# Patient Record
Sex: MALE | Race: WHITE | URBAN - METROPOLITAN AREA
[De-identification: names, ages, dates, MRNs, and addresses within clinical notes are randomized per-mention and may not be internally consistent; named-entity substitution may affect disease eponyms.]

---

## 2017-01-01 ENCOUNTER — MEDICAL CORRESPONDENCE (OUTPATIENT)
Dept: HEALTH INFORMATION MANAGEMENT | Facility: CLINIC | Age: 72
End: 2017-01-01

## 2017-01-01 ENCOUNTER — OFFICE VISIT (OUTPATIENT)
Dept: NEUROLOGY | Facility: CLINIC | Age: 72
End: 2017-01-01

## 2017-01-01 ENCOUNTER — ALLIED HEALTH/NURSE VISIT (OUTPATIENT)
Dept: NEUROLOGY | Facility: CLINIC | Age: 72
End: 2017-01-01

## 2017-01-01 ENCOUNTER — TRANSFERRED RECORDS (OUTPATIENT)
Dept: HEALTH INFORMATION MANAGEMENT | Facility: CLINIC | Age: 72
End: 2017-01-01

## 2017-01-01 VITALS
HEART RATE: 62 BPM | OXYGEN SATURATION: 99 % | DIASTOLIC BLOOD PRESSURE: 69 MMHG | BODY MASS INDEX: 25.18 KG/M2 | WEIGHT: 170 LBS | HEIGHT: 69 IN | SYSTOLIC BLOOD PRESSURE: 109 MMHG

## 2017-01-01 DIAGNOSIS — M62.81 GENERALIZED MUSCLE WEAKNESS: Primary | ICD-10-CM

## 2017-01-01 DIAGNOSIS — Z71.9 VISIT FOR COUNSELING: Primary | ICD-10-CM

## 2017-01-01 DIAGNOSIS — G12.21 ALS (AMYOTROPHIC LATERAL SCLEROSIS) (H): Primary | ICD-10-CM

## 2017-01-01 DIAGNOSIS — G12.20 MOTOR NEURON DISEASE OR SYNDROME (H): Primary | ICD-10-CM

## 2017-01-01 LAB
EXPTIME-PRE: 10.3 SEC
EXPTIME-PRE: 10.3 SEC
FEF2575-%PRED-POST: 32 %
FEF2575-%PRED-POST: 32 %
FEF2575-%PRED-PRE: 99 %
FEF2575-%PRED-PRE: 99 %
FEF2575-POST: 0.72 L/SEC
FEF2575-POST: 0.72 L/SEC
FEF2575-PRE: 2.19 L/SEC
FEF2575-PRE: 2.19 L/SEC
FEF2575-PRED: 2.21 L/SEC
FEF2575-PRED: 2.21 L/SEC
FEFMAX-%PRED-PRE: 100 %
FEFMAX-%PRED-PRE: 100 %
FEFMAX-PRE: 7.71 L/SEC
FEFMAX-PRE: 7.71 L/SEC
FEFMAX-PRED: 7.7 L/SEC
FEFMAX-PRED: 7.7 L/SEC
FEV1-%PRED-PRE: 97 %
FEV1-%PRED-PRE: 97 %
FEV1-PRE: 2.91 L
FEV1-PRE: 2.91 L
FEV1FEV6-PRE: 76 %
FEV1FEV6-PRE: 76 %
FEV1FEV6-PRED: 77 %
FEV1FEV6-PRED: 77 %
FEV1FVC-PRE: 75 %
FEV1FVC-PRE: 75 %
FEV1FVC-PRED: 76 %
FEV1FVC-PRED: 76 %
FIFMAX-PRE: 5.18 L/SEC
FIFMAX-PRE: 5.18 L/SEC
FVC-%PRED-PRE: 98 %
FVC-%PRED-PRE: 98 %
FVC-PRE: 3.89 L
FVC-PRE: 3.89 L
FVC-PRED: 3.96 L
FVC-PRED: 3.96 L
MEP-PRE: 70 CMH2O
MEP-PRE: 70 CMH2O
MIP-PRE: -60 CMH2O
MIP-PRE: -60 CMH2O

## 2017-01-01 RX ORDER — FAMOTIDINE 20 MG/1
20 TABLET, FILM COATED ORAL
COMMUNITY
Start: 2017-01-01

## 2017-01-01 RX ORDER — CLOPIDOGREL BISULFATE 75 MG/1
75 TABLET ORAL
COMMUNITY
Start: 2017-01-01

## 2017-01-01 RX ORDER — ROSUVASTATIN CALCIUM 20 MG/1
20 TABLET, COATED ORAL
COMMUNITY
Start: 2017-01-01

## 2017-01-01 RX ORDER — ASPIRIN 325 MG
325 TABLET ORAL
COMMUNITY

## 2017-01-01 RX ORDER — RILUZOLE 50 MG/1
50 TABLET, FILM COATED ORAL EVERY 12 HOURS
Qty: 60 TABLET | Refills: 3 | Status: SHIPPED | OUTPATIENT
Start: 2017-01-01

## 2017-01-01 RX ORDER — LISINOPRIL 5 MG/1
5 TABLET ORAL
COMMUNITY
Start: 2017-01-01

## 2017-01-01 RX ORDER — CARVEDILOL 3.12 MG/1
3.12 TABLET ORAL
COMMUNITY
Start: 2017-01-01

## 2017-01-01 RX ORDER — TAMSULOSIN HYDROCHLORIDE 0.4 MG/1
0.4 CAPSULE ORAL
COMMUNITY

## 2017-01-01 RX ORDER — LACTULOSE 10 G/15ML
30 SOLUTION ORAL
COMMUNITY
Start: 2016-08-10

## 2017-01-01 RX ORDER — CYCLOBENZAPRINE HCL 10 MG
10 TABLET ORAL
COMMUNITY
Start: 2016-08-10

## 2017-01-01 RX ORDER — TRAMADOL HYDROCHLORIDE 50 MG/1
50 TABLET ORAL
COMMUNITY
Start: 2016-08-10

## 2017-01-01 ASSESSMENT — PAIN SCALES - GENERAL: PAINLEVEL: NO PAIN (0)

## 2017-11-02 NOTE — MR AVS SNAPSHOT
After Visit Summary   11/2/2017    Gomez Rice    MRN: [id number]           Patient Information     Date Of Birth          1945        Visit Information        Provider Department      11/2/2017 11:00 AM Boby Jay MD [facility] Neurology        Today's Diagnoses     ALS (amyotrophic lateral sclerosis) (H)    -  1      Care Instructions    Dr Jay has prescribed Riluzole and provided you with standing lab orders to have repeat labs 30 days, 60 days and 90 days after starting.  Your [facility] can assist you for future refills of the Riluzole or if you choose to start Radicava.    If you have questions or concerns following today's appointment, please contact    the neurology department triage line at [phone] option 3, ask for Chana.                      Follow-ups after your visit        Additional Services     PHYSICAL THERAPY REFERRAL       PT Clinician to evaluate and treat patient in ALS Clinic.            SPEECH THERAPY REFERRAL       Speech Language Pathologist to evaluate and treat patient in ALS Clinic.            OCCUPATIONAL THERAPY REFERRAL       OT Clinician to evaluate and treat patient in ALS Clinic.                  Future tests that were ordered for you today     Open Standing Orders        Priority Remaining Interval Expires Ordered    Hepatic panel Routine 3/3 monthly 11/2/2018 11/2/2017            Who to contact     Please call your clinic at [phone] to:    Ask questions about your health    Make or cancel appointments    Discuss your medicines    Learn about your test results    Speak to your doctor   If you have compliments or concerns about an experience at your clinic, or if you wish to file a complaint, please contact [facility] Patient Relations at [phone] or email us at [email or web address]         Additional Information About Your Visit        MyChart Information     MyChart  "is an electronic gateway that provides easy, online access to your medical records. With KwiClick, you can request a clinic appointment, read your test results, renew a prescription or communicate with your care team.     To sign up for KwiClick visit the website at [email or web address]   You will be asked to enter the access code listed below, as well as some personal information. Please follow the directions to create your username and password.     Your access code is: [email or web address]  Expires: 2018  6:30 AM     Your access code will  in 90 days. If you need help or a new code, please contact your [facility] or call [phone] for assistance.        Care EveryWhere ID     This is your Care EveryWhere ID. This could be used by other organizations to access your [facility] medical records  [id number]        Your Vitals Were     Pulse Height Pulse Oximetry BMI (Body Mass Index)          62 1.753 m (5' 9\") 99% 25.1 kg/m2         Blood Pressure from Last 3 Encounters:   17 109/69    Weight from Last 3 Encounters:   17 77.1 kg (170 lb)                 Today's Medication Changes          These changes are accurate as of: 17  1:08 PM.  If you have any questions, ask your nurse or doctor.               Start taking these medicines.        Dose/Directions    riluzole 50 MG tablet   Commonly known as:  RILUTEK   Used for:  ALS (amyotrophic lateral sclerosis) (H)   Started by:  Boby Jay MD        Dose:  50 mg   Take 1 tablet (50 mg) by mouth every 12 hours   Quantity:  60 tablet   Refills:  3            Where to get your medicines      Some of these will need a paper prescription and others can be bought over the counter.  Ask your nurse if you have questions.     Bring a paper prescription for each of these medications     riluzole 50 MG tablet                Primary Care Provider Office Phone # Fax #    Alexandru Bullock [phone] " [phone]       [facility] [address]  [facility] [address]        Equal Access to Services     KRISTAL DICKSON : Hadkirby kristi hylton rolly Gandara, waartda luqjeff, qanahumta kaalmada brisa, kendall courtneybrayan paul laKikizoran mcnally. So [facility] [phone].    ATENCIÓN: Si habla español, tiene a alvarenga disposición servicios gratuitos de asistencia lingüística. Llame al [phone].    We comply with applicable federal civil rights laws and [place] laws. We do not discriminate on the basis of race, color, national origin, age, disability, sex, sexual orientation, or gender identity.            Thank you!     Thank you for choosing [facility] NEUROLOGY  for your care. Our goal is always to provide you with excellent care. Hearing back from our patients is one way we can continue to improve our services. Please take a few minutes to complete the written survey that you may receive in the mail after your visit with us. Thank you!             Your Updated Medication List - Protect others around you: Learn how to safely use, store and throw away your medicines at www.disposemymeds.org.          This list is accurate as of: 11/2/17  1:08 PM.  Always use your most recent med list.                   Brand Name Dispense Instructions for use Diagnosis    aspirin 325 MG tablet      Take 325 mg by mouth        carvedilol 3.125 MG tablet    COREG     Take 3.125 mg by mouth        clopidogrel 75 MG tablet    PLAVIX     Take 75 mg by mouth        cyclobenzaprine 10 MG tablet    FLEXERIL     Take 10 mg by mouth        famotidine 20 MG tablet    PEPCID     Take 20 mg by mouth        lactulose 10 GM/15ML solution    CHRONULAC     Take 30 mLs by mouth        lisinopril 5 MG tablet    PRINIVIL/ZESTRIL     Take 5 mg by mouth        riluzole 50 MG tablet    RILUTEK    60 tablet    Take 1 tablet (50 mg) by mouth every 12 hours    ALS (amyotrophic lateral sclerosis) (H)       rosuvastatin 20 MG tablet    CRESTOR     Take 20 mg by mouth         tamsulosin 0.4 MG capsule    FLOMAX     Take 0.4 mg by mouth        traMADol 50 MG tablet    ULTRAM     Take 50 mg by mouth

## 2017-11-02 NOTE — PROGRESS NOTES
Social Work Brief Intervention  [facility]    Data/Intervention:    Patient Name:  Gomez Rice  /Age:  1945 (72 year old)    Visit Type: in person  Referral Source: ALS clinic  Reason for Referral:  assessment    Collaborated With:    -Gomez, his dtr/son in law and grand dtr  -Members of the ALS interdisciplinary team    Patient Concerns/Issues:   Pt plans to follow with the [facility] who are already involved and securing DME and services for Pt. He is here today to confirm dx. [facility] who [facility].   Pt was living alone and recently moved in with his dtr and son in law. The [facility] is paying for a ramp for their house. Dtr has MS and will have limited capacity for a lot of physical care of Pt. Discussed that he will be able to get personal care assistance thru the [facility] and ALSA association.     Intervention/Education/Resources Provided:  ALSA services  Emotional support provided today as his dx was confirmed.    Assessment/Plan:  No immediate needs identified today. Pt has good family support and will continue to be seen at the [facility] and work with SW there for services at home.

## 2017-11-02 NOTE — LETTER
2017      Alexandru Bullock MD   [facility]    [address] [place] [address]       RE: Gomez Rice   MRN: [id number]   : 1945      Dear Doctors:      I had the pleasure to see Mr. Rice at the [facility] for suspected ALS.  As you know, he is a very pleasant 72-year-old  who noticed symptoms 3 months ago.  He began falling and was tripping, especially with the right more than left foot.  His leg weakness got progressively worse over the next few months.  He now has some difficulty ascending stairs; he has to hold rails.  He has begun using a walker about 2 weeks ago.  He needs the walker inside the house too to prevent falls.  A power [facility] in [facility].  He has anorexia and has lost about 12 pounds of weight in the last few months.  He also describes widespread muscle twitching in his calves, thighs, upper extremities, right more than left.  About 2 months ago he noticed difficulty using his hands, dropping things very frequently, clumsiness, difficulty cutting his food and impairment of his handwriting.  He can sign and he can write still neatly but he has to slow down considerably.  He takes much longer to eat his food and put the spoon close to his mouth.About a month or 2 ago, he also developed dysphagia to liquids and solids with pretty frequent choking episodes, and increasingly slurred speech.  His voice is quieter and more effortful.  He has mild sialorrhea during the day and at night, but it is not particularly disturbing.  He gets more emotional lately but does not describe typical pseudobulbar affect.  He sometimes has difficulty clearing thicker secretions from his mouth.  He has not developed any head drop.  He additionally endorses dyspnea with standing, transferring, exertion or even in the shower. He sleeps with 2-3 pillows at night which is a  new habit.  He used to sleep with about one.  The reason he has to put additional pillows is not only neck discomfort but probably orthopnea, too. He denies numbness, tingling or other sensory symptoms in upper or lower extremities.  He has no incontinence of bowel or bladder.  No radicular pain.      [facility], the result of which I have not received yet.        PAST MEDICAL HISTORY:   Significant for prostate cancer diagnosed 8 months ago and treated with radiation and hormonal therapy.  He has not undergone any surgery.  He has congestive heart failure, coronary artery disease, status post stenting, hypertension, hyperlipidemia.  He also had a history of hydrocephalus and an arachnoid cyst in the brain, status post  shunt placement in 2005.        SOCIAL HISTORY:   He drinks a couple of glasses of wine per day.  No illicit drug use.  He quit smoking 2007 after his heart attack.  He lives alone in the [facility] but family is close.      CURRENT MEDICATIONS:   As per Epic record.       ALLERGIES:   Amoxicillin, reaction nausea and vomiting.        FAMILY HISTORY:   Negative for ALS, dementia, Parkinsonism or similar neurodegenerative disorders.        PHYSICAL EXAMINATION:   VITAL SIGNS:   His blood pressure is 109/69.  Pulse 62 and regular.  O2 sat 99% on room air.  Weight 77.1 kilos.  Height is 175.  He endorses no pain.  Pulmonary function tests were surprisingly good in terms of vital capacity showing a forced vital capacity of 3.89 liters in the sitting position or 98% predicted for age, height and sex, similar FEV1, normal ratio, and mildly low MIP at -60 cm water with MEP of 70. Supine spirometry showed MIP of -40 and a 15% drop of FVC.   NEUROLOGIC:   He seems to be sitting comfortably in the chair.  When I had him count with a single breath he could hardly reach 16-18 which is not very consistent with the vital capacity results recorded in his spirometry.   However, he was counting slower than normal due to his dysarthria.  He has no ptosis or ophthalmoparesis.  There is no nystagmus, and normal eye ductions and versions.  There is normal pupil reaction to light and accommodation.  His visual fields are full to confrontation bilaterally.  He has a symmetric smile.  He has moderate weakness of orbicularis oculi.  He can seel his lips well but his cheek puff is mildly weak.  His pterygoid muscles are fairly strong.  Tongue does not show definite fasciculations or atrophy but lateral tongue movements are definitely slow.  He has an unequivocal spastic dysarthria, although he is generally intelligible.  He has a present but not very brisk jaw jerk.  His neck flexion is 4+.  His extension is 4, mildly weak.  His strength in the extremities is as follows (right/left):  Shoulder abduction 4/5, biceps 5/5, triceps 5/5, wrist extensors 5/5, finger extensors 4/4, APB 4/4, FDI 5/4,  5/5, hip flexion 4+/4+, quadriceps 5/5, hamstrings 5/5 and foot dorsiflexion interestingly 5/5.  What is definitely abnormal is his tone.  It is increased in the left arm and bilateral legs in a spastic fashion.  Right arm tone is probably normal.  He is unequivocally bradykinetic with repetitive finger tapping and fist opening closure, as well as foot tap in all 4 limbs, left worse than right.  His reflexes are pathologically brisk.  They are 3+ in the upper extremities with vertical spread in biceps, triceps, brachioradialis.  He has bilateral pectoralis reflex and Azucena.  His left knee reflex is 4+ clonic, right is 3+, ankle reflexes are right 4+, left 3+.  There are about 5-6 beats of ankle clonus on the right.  Plantar responses are bilaterally neutral or mute.  He has fasciculations in a widespread distribution, including the vasti, tibialis anterior, gastrocnemii, forearm muscles and hands.  His sensory exam shows low normal vibration at the toes for a 72-year-old, 6-7 seconds at the  toes and medial malleoli, 15 seconds at the right index finger, 24 on the left.  He has normal position sensation in the toes.  Finger-to-nose is done without dysmetria.  There is no tremor.  He has a spastic gait and with very slow steps.        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        I spent about 60 minutes with the patient, of which more than half was counseling.  I think the patient unequivocally has upper motor neuron signs in the bulbar, cervical and lumbosacral region as he has spastic dysarthria, jaw jerk, pathologic reflexes and 3 limb spasticity.  In addition to this, he has mild weakness and fasciculations in many regions.  Based on the above information, he qualifies for a diagnosis of clinically definite ALS.  I will do an EMG on him today to provide evidence of widespread denervation and if that is the case, then I think additional workup for ALS mimics is unlikely to be of yield, especially with the bulbar involvement.    We discussed his course.  The progression of the weakness has been quite rapid.  Arguably, the forced vital capacity of 98% is a little surprising.With supine positioning MIP fell to -40 cm H2O which indicates some diaphragmatic weakness. Based on those results, I recommend that he starts BiPAP. This can meaningfully prolong his survival and consolidate his nighttime sleep.  I will prescribe him riluzole 50 mg b.i.d.  He is willing to take it.  He understands that this prolongs survival in ALS by 3-4 months.  I will order liver function tests which should be [facility] from here on, and then 3-4 times a year thereafter.  We also discussed the recent approval of Edaravone (Radicava) which does slow down the disease progression if done early. Technically with a forced vital capacity of 98% and disease duration of only 3 months, he is a suitable candidate.  I recommend that the application and the orders of the medication are done through  the [facility], because this is a complex process and involves placement of a port, homecare, etc. I explained to the patient that he would be committed to 10 days a month of infusion for the rest of his life and he is willing to consider this at the very least.  [facility] [place] [facility] an excellent job meeting his needs regarding PT, OT and speech and we do not have anything to add from that perspective.  The plan is for the patient to follow up there at the [facility], because traveling to [place] from [place] with that rapidly advancing ALS will be very inconvenient to him, to say the very least.      Thank you for allowing me to participate in the care of this patient.  I will notify you with the EMG results.       Sincerely,       Boby Jay MD    cc:   Maryann López MD   Physical Medicine & Rehabilitation   Spinal Cord Injury Medicine    [facility] Department of [facility] Affairs      D: 2017 12:28   T: 2017 13:40   MT: SHARONDA    Name:     AMBER HOGAN   MRN:      [id number]        Account:      [id number]   :      1945           Service Date: 2017    Document: [id number]

## 2017-11-02 NOTE — MR AVS SNAPSHOT
After Visit Summary   2017    Gomez Rice    MRN: [id number]           Patient Information     Date Of Birth          1945        Visit Information        Provider Department      2017 2:09 PM [facility]        Today's Diagnoses     Visit for counseling    -  1       Follow-ups after your visit        Future tests that were ordered for you today     Open Standing Orders        Priority Remaining Interval Expires Ordered    Hepatic panel Routine 3/3 monthly 2018            Who to contact     Please call your clinic at [phone] to:    Ask questions about your health    Make or cancel appointments    Discuss your medicines    Learn about your test results    Speak to your doctor   If you have compliments or concerns about an experience at your clinic, or if you wish to file a complaint, please contact [facility] Patient Relations at [phone] or email us at [email or web address]         Additional Information About Your Visit        MyChart Information     Runfaces is an electronic gateway that provides easy, online access to your medical records. With Runfaces, you can request a clinic appointment, read your test results, renew a prescription or communicate with your care team.     To sign up for [employer] visit the website at [email or web address]   You will be asked to enter the access code listed below, as well as some personal information. Please follow the directions to create your username and password.     Your access code is: [email or web address]  Expires: 2018  6:30 AM     Your access code will  in 90 days. If you need help or a new code, please contact your [facility] or call [phone] for assistance.        Care EveryWhere ID     This is your Care EveryWhere ID. This could be used by other organizations to access your [facility]  records  [id number]         Blood Pressure from Last 3 Encounters:   11/02/17 109/69    Weight from Last 3 Encounters:   11/02/17 77.1 kg (170 lb)              Today, you had the following     No orders found for display         Today's Medication Changes          These changes are accurate as of: 11/2/17  2:25 PM.  If you have any questions, ask your nurse or doctor.               Start taking these medicines.        Dose/Directions    riluzole 50 MG tablet   Commonly known as:  RILUTEK   Used for:  ALS (amyotrophic lateral sclerosis) (H)   Started by:  Boby Jay MD        Dose:  50 mg   Take 1 tablet (50 mg) by mouth every 12 hours   Quantity:  60 tablet   Refills:  3            Where to get your medicines      Some of these will need a paper prescription and others can be bought over the counter.  Ask your nurse if you have questions.     Bring a paper prescription for each of these medications     riluzole 50 MG tablet                Primary Care Provider Office Phone # Fax #    Alexandru Bullock [phone]       [facility] [address] [address]        Equal Access to Services     [facility] : Hadii kristi hylton hadasho Sokim, waaxda luqadaha, qaybta kaalmada brisa, kendall mcnally. So [facility] [phone].    ATENCIÓN: Si habla español, tiene a alvarenga disposición servicios gratuitos de asistencia lingüística. Kurt al [phone].    We comply with applicable federal civil rights laws and [place] laws. We do not discriminate on the basis of race, color, national origin, age, disability, sex, sexual orientation, or gender identity.            Thank you!     Thank you for choosing [facility] NEUROLOGY  for your care. Our goal is always to provide you with excellent care. Hearing back from our patients is one way we can continue to improve our services. Please take a few minutes to complete the written survey that you may receive in the  mail after your visit with us. Thank you!             Your Updated Medication List - Protect others around you: Learn how to safely use, store and throw away your medicines at www.disposemymeds.org.          This list is accurate as of: 11/2/17  2:25 PM.  Always use your most recent med list.                   Brand Name Dispense Instructions for use Diagnosis    aspirin 325 MG tablet      Take 325 mg by mouth        carvedilol 3.125 MG tablet    COREG     Take 3.125 mg by mouth        clopidogrel 75 MG tablet    PLAVIX     Take 75 mg by mouth        cyclobenzaprine 10 MG tablet    FLEXERIL     Take 10 mg by mouth        famotidine 20 MG tablet    PEPCID     Take 20 mg by mouth        lactulose 10 GM/15ML solution    CHRONULAC     Take 30 mLs by mouth        lisinopril 5 MG tablet    PRINIVIL/ZESTRIL     Take 5 mg by mouth        riluzole 50 MG tablet    RILUTEK    60 tablet    Take 1 tablet (50 mg) by mouth every 12 hours    ALS (amyotrophic lateral sclerosis) (H)       rosuvastatin 20 MG tablet    CRESTOR     Take 20 mg by mouth        tamsulosin 0.4 MG capsule    FLOMAX     Take 0.4 mg by mouth        traMADol 50 MG tablet    ULTRAM     Take 50 mg by mouth

## 2017-11-02 NOTE — MR AVS SNAPSHOT
After Visit Summary   2017    Gomez Rice    MRN: [id number]           Patient Information     Date Of Birth          1945        Visit Information        Provider Department      2017 1:00 PM Boby Jay MD  Health EMG        Today's Diagnoses     Motor neuron disease or syndrome (H)    -  1       Follow-ups after your visit        Future tests that were ordered for you today     Open Standing Orders        Priority Remaining Interval Expires Ordered    Hepatic panel Routine 3/3 monthly 2018          Open Future Orders        Priority Expected Expires Ordered    Needle EMG Each Extremity w/Paraspinal Area Complete (94300) Routine  2018    Needle EMG Each Extremity w/Paraspinal Area Limited (91166) Routine  2018            Who to contact     Please call your clinic at [phone] to:    Ask questions about your health    Make or cancel appointments    Discuss your medicines    Learn about your test results    Speak to your doctor   If you have compliments or concerns about an experience at your clinic, or if you wish to file a complaint, please contact [facility] Patient Relations at [phone] or email us at [email or web address]         Additional Information About Your Visit        MyChart Information     [employer] is an electronic gateway that provides easy, online access to your medical records. With [employer], you can request a clinic appointment, read your test results, renew a prescription or communicate with your care team.     To sign up for [employer] visit the website at [email or web address]   You will be asked to enter the access code listed below, as well as some personal information. Please follow the directions to create your username and password.     Your access code is: [email or web address]  Expires: 2018  6:30 AM     Your access code will  in 90 days. If  you need help or a new code, please contact your [facility] or call [phone] for assistance.        Care EveryWhere ID     This is your Care EveryWhere ID. This could be used by other organizations to access your [facility] medical records  [id number]         Blood Pressure from Last 3 Encounters:   11/02/17 109/69    Weight from Last 3 Encounters:   11/02/17 77.1 kg (170 lb)              We Performed the Following     HC NCS MOTOR W OR W/O F-WAVE, 7 OR 8     Needle EMG Non-Extremity Muscles w/Nerve Conduction (95208)          Today's Medication Changes          These changes are accurate as of: 11/2/17  3:36 PM.  If you have any questions, ask your nurse or doctor.               Start taking these medicines.        Dose/Directions    riluzole 50 MG tablet   Commonly known as:  RILUTEK   Used for:  ALS (amyotrophic lateral sclerosis) (H)   Started by:  Boby Jay MD        Dose:  50 mg   Take 1 tablet (50 mg) by mouth every 12 hours   Quantity:  60 tablet   Refills:  3            Where to get your medicines      Some of these will need a paper prescription and others can be bought over the counter.  Ask your nurse if you have questions.     Bring a paper prescription for each of these medications     riluzole 50 MG tablet                Primary Care Provider Office Phone # Fax #    Alexandru Bullock [phone]       [facility] [address] [address]        Equal Access to Services     KRISTAL DICKSON AH: Hadii kristi lofton Sokim, waaxda luqadaha, qaybta kaalmada brisa, kendall mcnally. So [facility] [phone].    ATENCIÓN: Si habla español, tiene a alvarenga disposición servicios gratuitos de asistencia lingüística. Kurt al [phone].    We comply with applicable federal civil rights laws and [place] laws. We do not discriminate on the basis of race, color, national origin, age, disability, sex, sexual  orientation, or gender identity.            Thank you!     Thank you for choosing [facility]  for your care. Our goal is always to provide you with excellent care. Hearing back from our patients is one way we can continue to improve our services. Please take a few minutes to complete the written survey that you may receive in the mail after your visit with us. Thank you!             Your Updated Medication List - Protect others around you: Learn how to safely use, store and throw away your medicines at www.disposemymeds.org.          This list is accurate as of: 11/2/17  3:36 PM.  Always use your most recent med list.                   Brand Name Dispense Instructions for use Diagnosis    aspirin 325 MG tablet      Take 325 mg by mouth        carvedilol 3.125 MG tablet    COREG     Take 3.125 mg by mouth        clopidogrel 75 MG tablet    PLAVIX     Take 75 mg by mouth        cyclobenzaprine 10 MG tablet    FLEXERIL     Take 10 mg by mouth        famotidine 20 MG tablet    PEPCID     Take 20 mg by mouth        lactulose 10 GM/15ML solution    CHRONULAC     Take 30 mLs by mouth        lisinopril 5 MG tablet    PRINIVIL/ZESTRIL     Take 5 mg by mouth        riluzole 50 MG tablet    RILUTEK    60 tablet    Take 1 tablet (50 mg) by mouth every 12 hours    ALS (amyotrophic lateral sclerosis) (H)       rosuvastatin 20 MG tablet    CRESTOR     Take 20 mg by mouth        tamsulosin 0.4 MG capsule    FLOMAX     Take 0.4 mg by mouth        traMADol 50 MG tablet    ULTRAM     Take 50 mg by mouth

## 2017-11-02 NOTE — LETTER
11/2/2017       RE: Gomez Rice  [address]  [place] [address]     Dear Colleague,    Thank you for referring your patient, Gomez Rice, to the [facility] EMG at [facility]. Please see a copy of my visit note below.        [facility]  Electrodiagnostic Laboratory    Nerve Conduction & EMG Report          Patient: Gomez Rice YOB: 1945  Patient ID: [id number] Age: 72 Years 1 Months      History & Examination:    72 year old man with suspected ALS. EMG requested to evaluate for this diagnosis.     Techniques: Motor and sensory conduction studies were done with surface recording electrodes. Temperature was monitored and recorded throughout the study. Upper extremities were maintained at a temperature of 32 degrees Centigrade or higher.  Lower extremities were maintained at a temperature of 31degrees Centigrade or higher. EMG was done with a concentric needle electrode.     Results:    Right median, ulnar, radial, and sural antidromic sensory NCSs were normal. Right median motor NCS showed normal distal latency, mildly attenuated distal CMAP amplitude without segmental changes, and normal conduction velocities. Right ulnar motor NCS showed mildly prolonged distal latency, normal CMAP amplitudes, and normal conduction velocities. Right deep peroneal and tibial motor NCSs were normal. Right tibial and median F-wave latencies were normal.   EMG showed abnormal spontaneous activity, principally abundant fasciculations, in virtually all sampled muscles, innervated by the right cervical, thoracic, and lumbosacral segments, as well as the upper trapezius. Fibrillations/positive waves were prominent at the right tibialis anterior, medial gastrocnemius, and present to a milder extent at right FDI and pronator teres, as tabulated. Many muscles showed mildly to moderately reduced recruitment of polyphasic, but not large, or long duration MUPs, as  tabulated.    Interpretation:    Widespread denervation changes affecting bulbar, cervical, thoracic and lumbosacral myotomes, with normal sensory nerve conduction studies and no motor conduction block. This pattern is consistent with motor neuron disease/ALS. Polyradiculopathies and pureyl motor neuropathies are part of the differential diagnosis.     EMG Physician:    Boby Jay MD         Sensory NCS      Nerve / Sites Rec. Site Onset Peak NP Amp Ref. PP Amp Dist Aschin Ref. Temp     ms ms  V  V  V cm m/s m/s  C   R MEDIAN - Dig II Anti      Wrist Dig II 2.92 4.11 23.7 10.0 44.8 14 48.0 48.0 31.7   R ULNAR - Dig V Anti      Wrist Dig V 2.50 3.28 9.8 8.0 11.4 12.5 50.0 48.0 30.4   R RADIAL - Snuff      Forearm Snuff 2.08 2.60 25.2 15.0 31.8 11 52.8 48.0 29.8   R SURAL - Lat Mall 60      Calf Ankle 3.49 4.38 7.7 5.0 5.5 14 40.1 38.0 28      Calf Ankle 3.49 4.38 6.1 5.0 5.6 14 40.1  27.9       Motor NCS      Nerve / Sites Rec. Site Lat Ref. Amp Ref. Rel Amp Dist Sachin Ref. Dur. Area Temp.     ms ms mV mV % cm m/s m/s ms %  C   R MEDIAN - APB      Wrist APB 4.27 4.40 4.2 5.0 100 8   5.99 100 29.8      Elbow APB 8.85  3.6  86.2 25 54.5 48.0 7.45 91.4 29.7      Axilla APB 10.52  3.4  82.4 9 54.0  8.49 564 29.3   R ULNAR - ADM      Wrist ADM 3.80 3.50 7.2 5.0 100 8   5.57 100 31.1      B.Elbow ADM 7.76  6.0  82.6 21 53.1 48.0 6.30 157 31      A.Elbow ADM 9.84  5.4  74.8 10 48.0 48.0 6.98 193 30.8      Axilla ADM 11.20  4.5  62.7 8 59.1 48.0 7.34 114 30.7   R DEEP PERONEAL - EDB 60      Ankle EDB 4.06 6.00 3.1 2.0 100 8   7.24 100 28.3      FibHead EDB 11.51  2.6  83.1 34 45.7 38.0 7.97 77.1 28.3      Pop Fos EDB 13.33  2.4  77 7 38.4 38.0 8.75 83.7 28.3   R TIBIAL - AH      Ankle AH 4.58 6.00 8.3 4.0 100 8   8.13 100 28      Pop Fos AH 14.11  6.0  72.9 44 46.2 38.0 9.06 76.1 27.9       F  Wave      Nerve Min F Lat Max F Lat Mean FLat Temp.    ms ms ms  C   R TIBIAL 48.59 60.94 55.71 27.8   R MEDIAN 26.35 40.83  34.41 33.5       EMG Summary Table     Spontaneous MUAP Recruitment    IA Fib Fasc H.F. Amp Dur. PPP Pattern   R. FIRST D INTEROSS Normal 1+ 1+ None N N 1+ Moderate Reduced   R. TRICEPS Normal None 2+ None N N None Normal   R. DELTOID Normal None 1+ None N N 1+ Mild Reduced   R. PRON TERES Normal 2+ 3+ None N N None Normal   R. BICEPS Normal None 3+ None N N 1+ Mild Reduced   R. TIB ANTERIOR Normal 3+ 1+ None N N 2+ Moderate Reduced   R. GASTROCN (MED) Normal 3+ 1+ None N N 1+ Moderate Reduced   R. VAST LATERALIS Normal 1+ 2+ None N N None Reduced activation   R. TRAPEZIUS (U) Normal None 2+ None N N 1+ Mild Reduced   R. THOR PSP (M) Normal None 2+ None N N None Normal                                Again, thank you for allowing me to participate in the care of your patient.      Sincerely,    Boby Jay MD

## 2017-11-02 NOTE — PROGRESS NOTES
2017      Alexandru Bullock MD   [facility]    [address] [place] [address]         RE: Gomez Rice   MRN: [id number]   : 1945      Dear Doctors:      I had the pleasure to see Mr. Rice at the [facility] for suspected ALS.  As you know, he is a very pleasant 72-year-old  who noticed symptoms 3 months ago.  He began falling and was tripping, especially with the right more than left foot.  His leg weakness got progressively worse over the next few months.  He now has some difficulty ascending stairs; he has to hold rails.  He has begun using a walker about 2 weeks ago.  He needs the walker inside the house too to prevent falls.  [facility].  He has anorexia and has lost about 12 pounds of weight in the last few months.  He also describes widespread muscle twitching in his calves, thighs, upper extremities, right more than left.  About 2 months ago he noticed difficulty using his hands, dropping things very frequently, clumsiness, difficulty cutting his food and impairment of his handwriting.  He can sign and he can write still neatly but he has to slow down considerably.  He takes much longer to eat his food and put the spoon close to his mouth.About a month or 2 ago, he also developed dysphagia to liquids and solids with pretty frequent choking episodes, and increasingly slurred speech.  His voice is quieter and more effortful.  He has mild sialorrhea during the day and at night, but it is not particularly disturbing.  He gets more emotional lately but does not describe typical pseudobulbar affect.  He sometimes has difficulty clearing thicker secretions from his mouth.  He has not developed any head drop.  He additionally endorses dyspnea with standing, transferring, exertion or even in the shower. He sleeps with 2-3 pillows at night which is a  new habit.  He used to sleep with about one.  The reason he has to put additional pillows is not only neck discomfort but probably orthopnea, too. He denies numbness, tingling or other sensory symptoms in upper or lower extremities.  He has no incontinence of bowel or bladder.  No radicular pain.      [facility], the result of which I have not received yet.        PAST MEDICAL HISTORY:   Significant for prostate cancer diagnosed 8 months ago and treated with radiation and hormonal therapy.  He has not undergone any surgery.  He has congestive heart failure, coronary artery disease, status post stenting, hypertension, hyperlipidemia.  He also had a history of hydrocephalus and an arachnoid cyst in the brain, status post  shunt placement in 2005.        SOCIAL HISTORY:   He drinks a couple of glasses of wine per day.  No illicit drug use.  He quit smoking 2007 after his heart attack.  He lives alone in the [place] but family is close.      CURRENT MEDICATIONS:   As per Epic record.       ALLERGIES:   Amoxicillin, reaction nausea and vomiting.        FAMILY HISTORY:   Negative for ALS, dementia, Parkinsonism or similar neurodegenerative disorders.        PHYSICAL EXAMINATION:   VITAL SIGNS:   His blood pressure is 109/69.  Pulse 62 and regular.  O2 sat 99% on room air.  Weight 77.1 kilos.  Height is 175.  He endorses no pain.  Pulmonary function tests were surprisingly good in terms of vital capacity showing a forced vital capacity of 3.89 liters in the sitting position or 98% predicted for age, height and sex, similar FEV1, normal ratio, and mildly low MIP at -60 cm water with MEP of 70. Supine spirometry showed MIP of -40 and a 15% drop of FVC.   NEUROLOGIC:   He seems to be sitting comfortably in the chair.  When I had him count with a single breath he could hardly reach 16-18 which is not very consistent with the vital capacity results recorded in his spirometry.   However, he was counting slower than normal due to his dysarthria.  He has no ptosis or ophthalmoparesis.  There is no nystagmus, and normal eye ductions and versions.  There is normal pupil reaction to light and accommodation.  His visual fields are full to confrontation bilaterally.  He has a symmetric smile.  He has moderate weakness of orbicularis oculi.  He can seel his lips well but his cheek puff is mildly weak.  His pterygoid muscles are fairly strong.  Tongue does not show definite fasciculations or atrophy but lateral tongue movements are definitely slow.  He has an unequivocal spastic dysarthria, although he is generally intelligible.  He has a present but not very brisk jaw jerk.  His neck flexion is 4+.  His extension is 4, mildly weak.  His strength in the extremities is as follows (right/left):  Shoulder abduction 4/5, biceps 5/5, triceps 5/5, wrist extensors 5/5, finger extensors 4/4, APB 4/4, FDI 5/4,  5/5, hip flexion 4+/4+, quadriceps 5/5, hamstrings 5/5 and foot dorsiflexion interestingly 5/5.  What is definitely abnormal is his tone.  It is increased in the left arm and bilateral legs in a spastic fashion.  Right arm tone is probably normal.  He is unequivocally bradykinetic with repetitive finger tapping and fist opening closure, as well as foot tap in all 4 limbs, left worse than right.  His reflexes are pathologically brisk.  They are 3+ in the upper extremities with vertical spread in biceps, triceps, brachioradialis.  He has bilateral pectoralis reflex and Azucena.  His left knee reflex is 4+ clonic, right is 3+, ankle reflexes are right 4+, left 3+.  There are about 5-6 beats of ankle clonus on the right.  Plantar responses are bilaterally neutral or mute.  He has fasciculations in a widespread distribution, including the vasti, tibialis anterior, gastrocnemii, forearm muscles and hands.  His sensory exam shows low normal vibration at the toes for a 72-year-old, 6-7 seconds at the  toes and medial malleoli, 15 seconds at the right index finger, 24 on the left.  He has normal position sensation in the toes.  Finger-to-nose is done without dysmetria.  There is no tremor.  He has a spastic gait and with very slow steps.        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        I spent about 60 minutes with the patient, of which more than half was counseling.  I think the patient unequivocally has upper motor neuron signs in the bulbar, cervical and lumbosacral region as he has spastic dysarthria, jaw jerk, pathologic reflexes and 3 limb spasticity.  In addition to this, he has mild weakness and fasciculations in many regions.  Based on the above information, he qualifies for a diagnosis of clinically definite ALS.  I will do an EMG on him today to provide evidence of widespread denervation and if that is the case, then I think additional workup for ALS mimics is unlikely to be of yield, especially with the bulbar involvement.    We discussed his course.  The progression of the weakness has been quite rapid.  Arguably, the forced vital capacity of 98% is a little surprising.With supine positioning MIP fell to -40 cm H2O which indicates some diaphragmatic weakness. Based on those results, I recommend that he starts BiPAP. This can meaningfully prolong his survival and consolidate his nighttime sleep.  I will prescribe him riluzole 50 mg b.i.d.  He is willing to take it.  He understands that this prolongs survival in ALS by 3-4 months.  I will order liver function tests which [facility] from [facility], and then 3-4 times a year thereafter.  We also discussed the recent approval of Edaravone (Radicava) which does slow down the disease progression if done early. Technically with a forced vital capacity of 98% and disease duration of only 3 months, he is a suitable candidate.  I recommend that the application and the orders of the medication are done through  the [facility], because this is a complex process and involves placement of a port, homecare, etc. I explained to the patient that he would be committed to 10 days a month of infusion for the rest of his life and he is willing to consider this at the very least.  [facility] [place] [facility] meeting his needs regarding PT, OT and speech and we do not have anything to add from that perspective.  The plan is for the patient to follow up there at the [facility], because traveling to [place] from [place] with that rapidly advancing ALS will be very inconvenient to him, to say the very least.      Thank you for allowing me to participate in the care of this patient.  I will notify you with the EMG results.       Sincerely,       Boby Cedeño MD      cc:     Maryann López MD   Physical Medicine & Rehabilitation   [facility] Medicine    [facility] Department of [facility]         BOBY CEDEÑO MD             D: 2017 12:28   T: 2017 13:40   MT: SHARONDA      Name:     AMBER HOGAN   MRN:      [id number]        Account:      [id number]   :      1945           Service Date: 2017      Document: [id number]

## 2017-11-02 NOTE — PROGRESS NOTES
[facility]  Electrodiagnostic Laboratory    Nerve Conduction & EMG Report          Patient: Gomez Rice YOB: 1945  Patient ID: [id number] Age: 72 Years 1 Months      History & Examination:    72 year old man with suspected ALS. EMG requested to evaluate for this diagnosis.     Techniques: Motor and sensory conduction studies were done with surface recording electrodes. Temperature was monitored and recorded throughout the study. Upper extremities were maintained at a temperature of 32 degrees Centigrade or higher.  Lower extremities were maintained at a temperature of 31degrees Centigrade or higher. EMG was done with a concentric needle electrode.     Results:    Right median, ulnar, radial, and sural antidromic sensory NCSs were normal. Right median motor NCS showed normal distal latency, mildly attenuated distal CMAP amplitude without segmental changes, and normal conduction velocities. Right ulnar motor NCS showed mildly prolonged distal latency, normal CMAP amplitudes, and normal conduction velocities. Right deep peroneal and tibial motor NCSs were normal. Right tibial and median F-wave latencies were normal.   EMG showed abnormal spontaneous activity, principally abundant fasciculations, in virtually all sampled muscles, innervated by the right cervical, thoracic, and lumbosacral segments, as well as the upper trapezius. Fibrillations/positive waves were prominent at the right tibialis anterior, medial gastrocnemius, and present to a milder extent at right FDI and pronator teres, as tabulated. Many muscles showed mildly to moderately reduced recruitment of polyphasic, but not large, or long duration MUPs, as tabulated.    Interpretation:    Widespread denervation changes affecting bulbar, cervical, thoracic and lumbosacral myotomes, with normal sensory nerve conduction studies and no motor conduction block. This pattern is consistent with motor neuron disease/ALS.  Polyradiculopathies and pureyl motor neuropathies are part of the differential diagnosis.     EMG Physician:    Boby Jay MD         Sensory NCS      Nerve / Sites Rec. Site Onset Peak NP Amp Ref. PP Amp Dist Sachin Ref. Temp     ms ms  V  V  V cm m/s m/s  C   R MEDIAN - Dig II Anti      Wrist Dig II 2.92 4.11 23.7 10.0 44.8 14 48.0 48.0 31.7   R ULNAR - Dig V Anti      Wrist Dig V 2.50 3.28 9.8 8.0 11.4 12.5 50.0 48.0 30.4   R RADIAL - Snuff      Forearm Snuff 2.08 2.60 25.2 15.0 31.8 11 52.8 48.0 29.8   R SURAL - Lat Mall 60      Calf Ankle 3.49 4.38 7.7 5.0 5.5 14 40.1 38.0 28      Calf Ankle 3.49 4.38 6.1 5.0 5.6 14 40.1  27.9       Motor NCS      Nerve / Sites Rec. Site Lat Ref. Amp Ref. Rel Amp Dist Sachin Ref. Dur. Area Temp.     ms ms mV mV % cm m/s m/s ms %  C   R MEDIAN - APB      Wrist APB 4.27 4.40 4.2 5.0 100 8   5.99 100 29.8      Elbow APB 8.85  3.6  86.2 25 54.5 48.0 7.45 91.4 29.7      Axilla APB 10.52  3.4  82.4 9 54.0  8.49 564 29.3   R ULNAR - ADM      Wrist ADM 3.80 3.50 7.2 5.0 100 8   5.57 100 31.1      B.Elbow ADM 7.76  6.0  82.6 21 53.1 48.0 6.30 157 31      A.Elbow ADM 9.84  5.4  74.8 10 48.0 48.0 6.98 193 30.8      Axilla ADM 11.20  4.5  62.7 8 59.1 48.0 7.34 114 30.7   R DEEP PERONEAL - EDB 60      Ankle EDB 4.06 6.00 3.1 2.0 100 8   7.24 100 28.3      FibHead EDB 11.51  2.6  83.1 34 45.7 38.0 7.97 77.1 28.3      Pop Fos EDB 13.33  2.4  77 7 38.4 38.0 8.75 83.7 28.3   R TIBIAL - AH      Ankle AH 4.58 6.00 8.3 4.0 100 8   8.13 100 28      Pop Fos AH 14.11  6.0  72.9 44 46.2 38.0 9.06 76.1 27.9       F  Wave      Nerve Min F Lat Max F Lat Mean FLat Temp.    ms ms ms  C   R TIBIAL 48.59 60.94 55.71 27.8   R MEDIAN 26.35 40.83 34.41 33.5       EMG Summary Table     Spontaneous MUAP Recruitment    IA Fib Fasc H.F. Amp Dur. PPP Pattern   R. FIRST D INTEROSS Normal 1+ 1+ None N N 1+ Moderate Reduced   R. TRICEPS Normal None 2+ None N N None Normal   R. DELTOID Normal None 1+ None N N 1+  Mild Reduced   R. PRON TERES Normal 2+ 3+ None N N None Normal   R. BICEPS Normal None 3+ None N N 1+ Mild Reduced   R. TIB ANTERIOR Normal 3+ 1+ None N N 2+ Moderate Reduced   R. GASTROCN (MED) Normal 3+ 1+ None N N 1+ Moderate Reduced   R. VAST LATERALIS Normal 1+ 2+ None N N None Reduced activation   R. TRAPEZIUS (U) Normal None 2+ None N N 1+ Mild Reduced   R. THOR PSP (M) Normal None 2+ None N N None Normal

## 2017-11-02 NOTE — PATIENT INSTRUCTIONS
Dr Jay has prescribed Riluzole and provided you with standing lab orders to have repeat labs 30 days, 60 days and 90 days after starting.  [facility] can assist you for future refills of the Riluzole or if you choose to start Radicava.    If you have questions or concerns following today's appointment, please contact    the neurology department triage line at [phone] option 3, ask for Chana.